# Patient Record
Sex: FEMALE | Race: BLACK OR AFRICAN AMERICAN | Employment: UNEMPLOYED | URBAN - METROPOLITAN AREA
[De-identification: names, ages, dates, MRNs, and addresses within clinical notes are randomized per-mention and may not be internally consistent; named-entity substitution may affect disease eponyms.]

---

## 2017-03-08 ENCOUNTER — TELEPHONE (OUTPATIENT)
Dept: FAMILY MEDICINE CLINIC | Age: 62
End: 2017-03-08

## 2017-03-08 NOTE — TELEPHONE ENCOUNTER
--[phone]    Patient called to confirm appointment for pap. Please advise if she is due for this pap at this time. Friday, March 24, 2017 09:25 AM f [facility], [email or web address], Complete Physical, 30min    cpe with pap      She said the last pap was done by Dr. Kaylah Lowe and that record has been here to the office.

## 2017-03-08 NOTE — TELEPHONE ENCOUNTER
Patient called for referral to cancer physician.     appt March 13, Monday    Aditi Lee MD  [phone]

## 2017-03-09 DIAGNOSIS — C18.9 MALIGNANT NEOPLASM OF COLON, UNSPECIFIED PART OF COLON (HCC): Primary | ICD-10-CM

## 2017-03-09 NOTE — TELEPHONE ENCOUNTER
Sid Canada called for the patient's insurance information. Chart is not reflecting insurance information.     Referrals is aware and will call Sid Canada at [phone]

## 2017-03-17 NOTE — TELEPHONE ENCOUNTER
[facility] sent the request however patient states her insurance no longer is affiliated with [employer] and would like the prescription sent to the [facility] pharmacy on [address] that has been added to the patient's chart  Requested Prescriptions     Pending Prescriptions Disp Refills    lisinopril (PRINIVIL, ZESTRIL) 10 mg tablet [Pharmacy Med Name: LISINOPRIL 10MG TABLETS] 30 Tab 0     Sig: TAKE 1 TABLET BY MOUTH EVERY DAY

## 2017-03-20 RX ORDER — LISINOPRIL 10 MG/1
TABLET ORAL
Qty: 30 TAB | Refills: 5 | Status: SHIPPED | OUTPATIENT
Start: 2017-03-20 | End: 2017-07-14 | Stop reason: SDUPTHER

## 2017-06-20 ENCOUNTER — OFFICE VISIT (OUTPATIENT)
Dept: FAMILY MEDICINE CLINIC | Age: 62
End: 2017-06-20

## 2017-06-20 VITALS
HEART RATE: 72 BPM | DIASTOLIC BLOOD PRESSURE: 79 MMHG | RESPIRATION RATE: 18 BRPM | HEIGHT: 66 IN | WEIGHT: 235 LBS | OXYGEN SATURATION: 99 % | SYSTOLIC BLOOD PRESSURE: 153 MMHG | TEMPERATURE: 98.3 F | BODY MASS INDEX: 37.77 KG/M2

## 2017-06-20 NOTE — PROGRESS NOTES
DUSTIN Hurtado is a 62 y.o. female who presents for concern for toe fungus. She notes that ~2 weeks ago she walked ~5K on a treadmill with her \"cute\" shoes on. She describes them as \"cute\" because they're \"too small for my feet\". During this, her feet bothered her and her right foot 2nd toe hurt for about 10 days afterwards and the nail is falling off. She is concerned if it might be a fungus. She does not have any history of toe fungus or diabetes, though she is prediabetic. PMHx:  Past Medical History:   Diagnosis Date    Adenocarcinoma of sigmoid colon ([facility] [address] 2014    Sigmoid adenocarcinoma s/p left colectomy Dr Reny Stallings at risk for recurrence in view of positive LN . Oncologist at [facility], Dr. Harjinder Brumfield Anemia, unspecified 5/17/2011    Hypertension     Not currently taking medications, controlled by diet and exercise    Prediabetes     Uterine fibroid      Meds:   Current Outpatient Prescriptions   Medication Sig Dispense Refill    lisinopril (PRINIVIL, ZESTRIL) 10 mg tablet TAKE 1 TABLET BY MOUTH EVERY DAY 30 Tab 5     Allergies: Allergies   Allergen Reactions    Banana Nausea Only    Codeine Other (comments)     \"high as a kite\"       Smoker:  History   Smoking Status    Former Smoker    Packs/day: 1.00    Years: 20.00    Types: Cigarettes    Quit date: 1/1/1996   Smokeless Tobacco    Never Used     ETOH:   History   Alcohol Use    Yes     Comment: 1 -3 drinks monthly     FH:   Family History   Problem Relation Age of Onset    Cancer Mother 61     colon    Elevated Lipids Mother     Heart Disease Father 61    Elevated Lipids Father      ROS:  Review of Systems   Constitutional: Negative for chills and fever. Respiratory: Negative for cough. Cardiovascular: Negative for chest pain. Skin: Negative for rash.      Physical Exam:  Visit Vitals    /79    Pulse 72    Temp 98.3 °F (36.8 °C) (Oral)    Resp 18    Ht 5' 6\" (1.676 m)    Wt 235 lb (106.6 kg)    SpO2 99%    BMI 37.93 kg/m2       Wt Readings from Last 3 Encounters:   11/07/16 237 lb 4.8 oz (107.6 kg)   04/15/16 230 lb (104.3 kg)   02/22/16 225 lb (102.1 kg)     BP Readings from Last 3 Encounters:   11/07/16 131/83   04/15/16 144/81   02/22/16 155/81      Physical Exam   Constitutional: She is oriented to person, place, and time. She appears well-developed and well-nourished. HENT:   Head: Normocephalic and atraumatic. Nose: Nose normal.   Eyes: Conjunctivae are normal. Right eye exhibits no discharge. Left eye exhibits no discharge. No scleral icterus. Cardiovascular: Normal rate, regular rhythm and normal heart sounds. Exam reveals no gallop and no friction rub. Pulmonary/Chest: Effort normal and breath sounds normal. No respiratory distress. Abdominal: Soft. Bowel sounds are normal. She exhibits no distension. There is no tenderness. Musculoskeletal: She exhibits no edema or tenderness. toes examined, no evidence of fungal infection on the skin of her feet or on her toenails. Toenail  cleanly, nailbed underneath clean, intact. Neurological: She is alert and oriented to person, place, and time. Skin: Skin is warm and dry. No rash noted. She is not diaphoretic. No erythema. No pallor. Psychiatric: She has a normal mood and affect. Judgment normal.   Vitals reviewed. Assessment     62 y.o. female with:    ICD-10-CM ICD-9-CM    1. Traumatic loss of toenail, right, initial encounter S91.201A 893.0               Plan       Loss of toenail 2/2 trauma from small shoes: Nail lifted off nailbed and fallen off from the exercise in small shoes, no evidence of toe fungus. Self limited and will resolve. BP High, pt didn't take her lisinopril today. Discussed compliance. I have discussed the diagnosis with the patient and the intended plan as seen in the above orders.  The patient has received an after-visit summary and questions were answered concerning future plans. I have discussed medication side effects and warnings with the patient as well.     Ursula Banks MD  Family Medicine Resident

## 2017-06-20 NOTE — PROGRESS NOTES
Chief Complaint   Patient presents with    Toe Pain     2nd toe on right foot painful in certain shoes, especially after working out in gym. .      1. Have you been to the ER, urgent care clinic since your last visit? Hospitalized since your last visit? No    2. Have you seen or consulted any other health care providers outside of the [address] [place] [address] since your last visit? Include any pap smears or colon screening.  No

## 2017-07-14 NOTE — TELEPHONE ENCOUNTER
Patient is requesting a refill on  Requested Prescriptions     Pending Prescriptions Disp Refills    lisinopril (PRINIVIL, ZESTRIL) 10 mg tablet 30 Tab 5     Thank you

## 2017-07-16 RX ORDER — LISINOPRIL 10 MG/1
TABLET ORAL
Qty: 30 TAB | Refills: 3 | Status: SHIPPED | OUTPATIENT
Start: 2017-07-16

## 2017-08-24 ENCOUNTER — TELEPHONE (OUTPATIENT)
Dept: FAMILY MEDICINE CLINIC | Age: 62
End: 2017-08-24

## 2022-01-17 LAB — MAMMOGRAPHY, EXTERNAL: NORMAL
